# Patient Record
Sex: FEMALE | Race: WHITE | NOT HISPANIC OR LATINO | Employment: FULL TIME | URBAN - METROPOLITAN AREA
[De-identification: names, ages, dates, MRNs, and addresses within clinical notes are randomized per-mention and may not be internally consistent; named-entity substitution may affect disease eponyms.]

---

## 2019-02-26 ENCOUNTER — SKIN CANCER EXAM (OUTPATIENT)
Dept: URBAN - METROPOLITAN AREA CLINIC 32 | Facility: CLINIC | Age: 59
Setting detail: DERMATOLOGY
End: 2019-02-26

## 2019-02-26 DIAGNOSIS — D48.5 NEOPLASM OF UNCERTAIN BEHAVIOR OF SKIN: ICD-10-CM

## 2019-02-26 PROBLEM — L82.1 OTHER SEBORRHEIC KERATOSIS: Status: RESOLVED | Noted: 2019-02-26

## 2019-02-26 PROBLEM — L57.0 ACTINIC KERATOSIS: Status: RESOLVED | Noted: 2019-02-26

## 2019-02-26 PROCEDURE — 17003 DESTRUCT PREMALG LES 2-14: CPT

## 2019-02-26 PROCEDURE — 17000 DESTRUCT PREMALG LESION: CPT

## 2019-02-26 PROCEDURE — 99214 OFFICE O/P EST MOD 30 MIN: CPT

## 2020-12-10 ENCOUNTER — WEB ENCOUNTER (OUTPATIENT)
Dept: URBAN - METROPOLITAN AREA CLINIC 50 | Facility: CLINIC | Age: 60
End: 2020-12-10

## 2020-12-10 ENCOUNTER — OFFICE VISIT (OUTPATIENT)
Dept: URBAN - METROPOLITAN AREA CLINIC 50 | Facility: CLINIC | Age: 60
End: 2020-12-10
Payer: COMMERCIAL

## 2020-12-10 ENCOUNTER — DASHBOARD ENCOUNTERS (OUTPATIENT)
Age: 60
End: 2020-12-10

## 2020-12-10 VITALS
SYSTOLIC BLOOD PRESSURE: 133 MMHG | TEMPERATURE: 96.9 F | BODY MASS INDEX: 20.16 KG/M2 | WEIGHT: 133 LBS | HEART RATE: 70 BPM | DIASTOLIC BLOOD PRESSURE: 85 MMHG | HEIGHT: 68 IN

## 2020-12-10 DIAGNOSIS — R10.13 EPIGASTRIC PAIN: ICD-10-CM

## 2020-12-10 DIAGNOSIS — K59.01 CONSTIPATION: ICD-10-CM

## 2020-12-10 DIAGNOSIS — K21.9 ACID REFLUX: ICD-10-CM

## 2020-12-10 PROBLEM — 14760008 CONSTIPATION: Status: ACTIVE | Noted: 2020-12-10

## 2020-12-10 PROBLEM — 698065002 ACID REFLUX: Status: ACTIVE | Noted: 2020-12-10

## 2020-12-10 PROCEDURE — 99244 OFF/OP CNSLTJ NEW/EST MOD 40: CPT | Performed by: INTERNAL MEDICINE

## 2020-12-10 PROCEDURE — 99204 OFFICE O/P NEW MOD 45 MIN: CPT | Performed by: INTERNAL MEDICINE

## 2020-12-10 RX ORDER — PANTOPRAZOLE SODIUM 20 MG/1
1 TABLET TABLET, DELAYED RELEASE ORAL
Qty: 30 | Refills: 0 | OUTPATIENT

## 2020-12-10 NOTE — HPI-TODAY'S VISIT:
60 y.o. WF, lives w/ boyfriend, 4 children w/ 2 at home,  at [facility] PCP - Keo APPIAH w/ routine physical w/ labs w/ PCP Always feels like rock in epigatrium after eating Associated w/ constant nausea, but also has GERD Haven't tried any medicines for it; will massage it Syx for a long time No radiation of syx Chronic constipation - doesn't take anything - started to eat prunes which helps

## 2021-01-27 ENCOUNTER — OFFICE VISIT (OUTPATIENT)
Dept: URBAN - METROPOLITAN AREA SURGERY CENTER 18 | Facility: SURGERY CENTER | Age: 61
End: 2021-01-27